# Patient Record
Sex: FEMALE | Race: WHITE
[De-identification: names, ages, dates, MRNs, and addresses within clinical notes are randomized per-mention and may not be internally consistent; named-entity substitution may affect disease eponyms.]

---

## 2017-02-24 ENCOUNTER — HOSPITAL ENCOUNTER (OUTPATIENT)
Dept: HOSPITAL 44 - POD | Age: 82
End: 2017-02-24
Attending: PODIATRIST
Payer: MEDICARE

## 2017-02-24 DIAGNOSIS — M79.675: ICD-10-CM

## 2017-02-24 DIAGNOSIS — M79.674: ICD-10-CM

## 2017-02-24 DIAGNOSIS — B35.1: Primary | ICD-10-CM

## 2017-02-24 DIAGNOSIS — L84: ICD-10-CM

## 2017-02-24 PROCEDURE — 11721 DEBRIDE NAIL 6 OR MORE: CPT

## 2017-05-26 ENCOUNTER — HOSPITAL ENCOUNTER (OUTPATIENT)
Dept: HOSPITAL 44 - POD | Age: 82
End: 2017-05-26
Attending: PODIATRIST
Payer: MEDICARE

## 2017-05-26 DIAGNOSIS — B35.1: Primary | ICD-10-CM

## 2017-05-26 DIAGNOSIS — M79.675: ICD-10-CM

## 2017-05-26 DIAGNOSIS — M79.674: ICD-10-CM

## 2017-05-26 PROCEDURE — 11721 DEBRIDE NAIL 6 OR MORE: CPT

## 2017-08-25 ENCOUNTER — HOSPITAL ENCOUNTER (OUTPATIENT)
Dept: HOSPITAL 44 - POD | Age: 82
End: 2017-08-25
Attending: PODIATRIST
Payer: MEDICARE

## 2017-08-25 DIAGNOSIS — B35.1: Primary | ICD-10-CM

## 2017-08-25 DIAGNOSIS — M79.674: ICD-10-CM

## 2017-08-25 DIAGNOSIS — M79.675: ICD-10-CM

## 2017-08-25 PROCEDURE — 11721 DEBRIDE NAIL 6 OR MORE: CPT

## 2017-12-08 ENCOUNTER — HOSPITAL ENCOUNTER (OUTPATIENT)
Dept: HOSPITAL 44 - POD | Age: 82
End: 2017-12-08
Attending: PODIATRIST
Payer: MEDICARE

## 2017-12-08 DIAGNOSIS — M79.675: ICD-10-CM

## 2017-12-08 DIAGNOSIS — B35.1: Primary | ICD-10-CM

## 2017-12-08 DIAGNOSIS — M79.674: ICD-10-CM

## 2017-12-08 PROCEDURE — 11721 DEBRIDE NAIL 6 OR MORE: CPT

## 2018-03-09 ENCOUNTER — HOSPITAL ENCOUNTER (OUTPATIENT)
Dept: HOSPITAL 44 - POD | Age: 83
End: 2018-03-09
Attending: PODIATRIST
Payer: MEDICARE

## 2018-03-09 DIAGNOSIS — M79.675: ICD-10-CM

## 2018-03-09 DIAGNOSIS — B35.1: Primary | ICD-10-CM

## 2018-03-09 DIAGNOSIS — M79.674: ICD-10-CM

## 2018-03-09 PROCEDURE — 11721 DEBRIDE NAIL 6 OR MORE: CPT

## 2018-06-19 ENCOUNTER — HOSPITAL ENCOUNTER (OUTPATIENT)
Dept: HOSPITAL 44 - POD | Age: 83
End: 2018-06-19
Attending: PODIATRIST
Payer: MEDICARE

## 2018-06-19 DIAGNOSIS — B35.1: Primary | ICD-10-CM

## 2018-06-19 DIAGNOSIS — M79.674: ICD-10-CM

## 2018-06-19 DIAGNOSIS — M79.675: ICD-10-CM

## 2018-06-19 PROCEDURE — 11721 DEBRIDE NAIL 6 OR MORE: CPT

## 2018-09-27 ENCOUNTER — HOSPITAL ENCOUNTER (OUTPATIENT)
Dept: HOSPITAL 44 - RAD | Age: 83
End: 2018-09-27
Attending: PODIATRIST
Payer: MEDICARE
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
## 2018-09-27 DIAGNOSIS — S97.102A: Primary | ICD-10-CM
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
## 2018-09-27 PROCEDURE — 73630 X-RAY EXAM OF FOOT: CPT
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
## 2019-07-18 ENCOUNTER — HOSPITAL ENCOUNTER (OUTPATIENT)
Dept: HOSPITAL 44 - RAD | Age: 84
End: 2019-07-18
Attending: FAMILY MEDICINE
Payer: MEDICARE

## 2019-07-18 DIAGNOSIS — M89.9: Primary | ICD-10-CM

## 2019-07-18 DIAGNOSIS — S32.501A: ICD-10-CM

## 2019-07-18 PROCEDURE — 77080 DXA BONE DENSITY AXIAL: CPT
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
